# Patient Record
(demographics unavailable — no encounter records)

---

## 2025-03-06 NOTE — DISCUSSION/SUMMARY
[FreeTextEntry1] : 11yo M w/ PMH of asthma, allergic rhinitis presenting for his [facility]. Doing well, gaining weight and height proportionally. Asthma well controlled, follows with A/I and Pulm PRN.   # 10 y WCC - Passed vision and hearing - Flu shot declined today - CBC/Lead ordered - Counseling provided: Continue balanced diet with all food groups. Brush teeth twice a day with toothbrush. Recommend visit to dentist. Help child to maintain consistent daily routines and sleep schedule. Personal hygiene and puberty explained. School discussed. Ensure home is safe. Teach child about personal safety. Use consistent, positive discipline. Limit screen time to no more than 2 hours per day. Encourage physical activity.  F/u in 1 year for 11y [facility], or sooner if needed.

## 2025-03-06 NOTE — HISTORY OF PRESENT ILLNESS
[Mother] : mother [Father] : father [Fruit] : fruit [Vegetables] : vegetables [Meat] : meat [Eggs] : eggs [Fish] : fish [Dairy] : dairy [Eats meals with family] : eats meals with family [___ stools per day] : [unfilled]  stools per day [In own bed] : In own bed [Sleeps ___ hours per night] : sleeps [unfilled] hours per night [Brushing teeth twice/d] : brushing teeth twice per day [Yes] : Patient goes to dentist yearly [Toothpaste] : Primary Fluoride Source: Toothpaste [Grade ___] : Grade [unfilled] [Adequate social interactions] : adequate social interactions [Adequate behavior] : adequate behavior [Adequate performance] : adequate performance [Adequate attention] : adequate attention [No difficulties with Homework] : no difficulties with homework [Appropriately restrained in motor vehicle] : appropriately restrained in motor vehicle [Supervised outdoor play] : supervised outdoor play [Parent knows child's friends] : parent knows child's friends [Monitored computer use] : monitored computer use [Normal] : Normal [No] : No cigarette smoke exposure [Supervised around water] : supervised around water [Influenza] : Influenza [NO] : No [Exposure to alcohol] : no exposure to alcohol [Exposure to tobacco] : no exposure to tobacco [Exposure to electronic nicotine delivery system] : No exposure to electronic nicotine delivery system [FreeTextEntry7] : Jameel is a 9yo M w/ PMH of asthma and allergies presenting for his C. Doing well, no interim hospital visits or illnesses. [de-identified] : None [de-identified] : No dairy due to lactose intolerance - Oat milk with calcium, OJ w/ calcium [de-identified] : due today [de-identified] : Swims and Plays Football.